# Patient Record
Sex: FEMALE | Race: WHITE | Employment: UNEMPLOYED | URBAN - METROPOLITAN AREA
[De-identification: names, ages, dates, MRNs, and addresses within clinical notes are randomized per-mention and may not be internally consistent; named-entity substitution may affect disease eponyms.]

---

## 2017-01-04 RX ORDER — ALPRAZOLAM 0.25 MG/1
TABLET ORAL
Qty: 30 TABLET | Refills: 0 | Status: SHIPPED | OUTPATIENT
Start: 2017-01-04 | End: 2017-02-13

## 2017-01-04 NOTE — TELEPHONE ENCOUNTER
From: Isidra Ventura  To: Rakan Ching DO  Sent: 1/4/2017 1:55 PM CST  Subject: Medication Renewal Request    Original authorizing provider: DO Isidra Godoy would like a refill of the following medications:  alprazolam 0.25 MG Or

## 2017-01-05 RX ORDER — ALPRAZOLAM 0.25 MG/1
TABLET ORAL
Qty: 30 TABLET | Refills: 0 | OUTPATIENT
Start: 2017-01-05

## 2017-01-20 RX ORDER — ZOLPIDEM TARTRATE 10 MG/1
10 TABLET ORAL NIGHTLY PRN
Qty: 30 TABLET | Refills: 2 | Status: SHIPPED | OUTPATIENT
Start: 2017-01-20 | End: 2017-03-08

## 2017-01-20 NOTE — TELEPHONE ENCOUNTER
From: [facility]  To: Riya Watkins DO  Sent: 1/19/2017 9:47 AM CST  Subject: Medication Renewal Request    Original authorizing provider: Juan Nash DO    [facility] would like a refill of the following medications:  ZOLPIDEM TARTRATE 10

## 2017-02-13 RX ORDER — ALPRAZOLAM 0.25 MG/1
TABLET ORAL
Qty: 30 TABLET | Refills: 0 | Status: SHIPPED | OUTPATIENT
Start: 2017-02-13 | End: 2017-05-08

## 2017-03-04 ENCOUNTER — TELEPHONE (OUTPATIENT)
Dept: FAMILY MEDICINE CLINIC | Facility: CLINIC | Age: 52
End: 2017-03-04

## 2017-03-06 NOTE — TELEPHONE ENCOUNTER
Pt calling to check her My Chart message was received regarding medication refill of Zolpidem Tartrate 10 MG Oral Tab. Noted message received 3/4/17 and informed pt to allow up to 72 hr for refills and she verbalized understanding.  Pt reports she has 1 pil

## 2017-03-10 RX ORDER — ZOLPIDEM TARTRATE 10 MG/1
TABLET ORAL
Qty: 30 TABLET | Refills: 0 | Status: SHIPPED | OUTPATIENT
Start: 2017-03-10 | End: 2017-06-30

## 2017-05-04 RX ORDER — ZOLPIDEM TARTRATE 10 MG/1
TABLET ORAL
Qty: 30 TABLET | Refills: 0 | Status: CANCELLED | OUTPATIENT
Start: 2017-05-04

## 2017-05-04 NOTE — TELEPHONE ENCOUNTER
From: Isidra Ventura  To: Melissa Alarcon  Sent: 4/25/2017 5:17 AM CDT  Subject: Medication Renewal Request    Original authorizing provider: MANDIE Dhaliwal would like a refill of the following medications:  Randine Fabry

## 2017-05-08 ENCOUNTER — LAB ENCOUNTER (OUTPATIENT)
Dept: LAB | Age: 52
End: 2017-05-08
Attending: FAMILY MEDICINE
Payer: COMMERCIAL

## 2017-05-08 DIAGNOSIS — D75.89 MACROCYTOSIS: ICD-10-CM

## 2017-05-08 DIAGNOSIS — E55.9 VITAMIN D DEFICIENCY: ICD-10-CM

## 2017-05-08 DIAGNOSIS — D72.810 LYMPHOCYTOPENIA: ICD-10-CM

## 2017-05-08 PROCEDURE — 82607 VITAMIN B-12: CPT | Performed by: FAMILY MEDICINE

## 2017-05-08 PROCEDURE — 82306 VITAMIN D 25 HYDROXY: CPT | Performed by: FAMILY MEDICINE

## 2017-05-08 PROCEDURE — 36415 COLL VENOUS BLD VENIPUNCTURE: CPT | Performed by: FAMILY MEDICINE

## 2017-05-08 PROCEDURE — 85025 COMPLETE CBC W/AUTO DIFF WBC: CPT | Performed by: FAMILY MEDICINE

## 2017-05-08 PROCEDURE — 82746 ASSAY OF FOLIC ACID SERUM: CPT | Performed by: FAMILY MEDICINE

## 2017-05-08 NOTE — PROGRESS NOTES
Dahiana Rush is a 47year old female. HPI:   Pt. Is here to follow up on her meds. Insomnia -- She is using the Ambien nightly to sleep. She tries to take half a tablet, but many times she does end up taking the entire tablet.   She denies any side Diagnosis Date   • Depression    • Other screening mammogram    • Screening for malignant neoplasm of the cervix       Social History:    Smoking Status: Never Smoker                      Smokeless Status: Never Used                        Alcohol Use: Y on exertion  GI: denies abdominal pain,denies heartburn  PSYCHE: Anxiety  ENDO: insomnia; overweight  MUSCULOSKELETAL: denies back pain  EXTREMITIES:  No pain or numbness    EXAM:   /78 mmHg  Pulse 70  Resp 14  Ht 64.5\"  Wt 152 lb  BMI 25.70 kg/m2

## 2017-06-28 RX ORDER — ALPRAZOLAM 0.25 MG/1
TABLET ORAL
Qty: 30 TABLET | Refills: 0 | Status: SHIPPED | OUTPATIENT
Start: 2017-06-28 | End: 2017-08-09

## 2017-06-30 RX ORDER — ZOLPIDEM TARTRATE 10 MG/1
TABLET ORAL
Qty: 30 TABLET | Refills: 0 | Status: SHIPPED | OUTPATIENT
Start: 2017-06-30 | End: 2017-08-09

## 2017-06-30 NOTE — TELEPHONE ENCOUNTER
See below. Rx was approved 6/28 for xanax, do you recall signing?   Rx pended for zolpidem  Last fill 3/10

## 2017-06-30 NOTE — TELEPHONE ENCOUNTER
Pt went to pharmacy to p/u ALPRAZOLAM 0.25 MG Oral Tab and [facility] told her they never received. Pls re-fax. Also, pt is asking for refill on Ambien to [facility].

## 2017-08-09 RX ORDER — ZOLPIDEM TARTRATE 10 MG/1
TABLET ORAL
Qty: 30 TABLET | Refills: 0 | Status: SHIPPED | OUTPATIENT
Start: 2017-08-09 | End: 2017-09-08

## 2017-08-09 RX ORDER — ALPRAZOLAM 0.25 MG/1
0.25 TABLET ORAL NIGHTLY PRN
Qty: 30 TABLET | Refills: 0 | Status: SHIPPED | OUTPATIENT
Start: 2017-08-09 | End: 2017-10-16

## 2017-08-09 NOTE — TELEPHONE ENCOUNTER
From: Bennett Sidhu  Sent: 8/9/2017 6:17 AM CDT  Subject: Medication Renewal Request    Bennett Sidhu would like a refill of the following medications:  ALPRAZOLAM 0.25 MG Oral Tab [Regine He DO]  Zolpidem Tartrate 10 MG Oral Tab [Regine He

## 2017-09-08 RX ORDER — ZOLPIDEM TARTRATE 10 MG/1
TABLET ORAL
Qty: 30 TABLET | Refills: 0
Start: 2017-09-08

## 2017-09-11 RX ORDER — ZOLPIDEM TARTRATE 10 MG/1
TABLET ORAL
Qty: 30 TABLET | Refills: 0 | Status: SHIPPED | OUTPATIENT
Start: 2017-09-11 | End: 2017-10-16

## 2017-10-03 ENCOUNTER — OFFICE VISIT (OUTPATIENT)
Dept: FAMILY MEDICINE CLINIC | Facility: CLINIC | Age: 52
End: 2017-10-03

## 2017-10-03 ENCOUNTER — HOSPITAL ENCOUNTER (OUTPATIENT)
Dept: GENERAL RADIOLOGY | Age: 52
Discharge: HOME | End: 2017-10-03
Attending: FAMILY MEDICINE
Payer: COMMERCIAL

## 2017-10-03 VITALS
RESPIRATION RATE: 14 BRPM | DIASTOLIC BLOOD PRESSURE: 88 MMHG | HEIGHT: 65 IN | BODY MASS INDEX: 24.99 KG/M2 | WEIGHT: 150 LBS | HEART RATE: 68 BPM | SYSTOLIC BLOOD PRESSURE: 130 MMHG

## 2017-10-03 DIAGNOSIS — M25.511 CHRONIC RIGHT SHOULDER PAIN: ICD-10-CM

## 2017-10-03 DIAGNOSIS — Z00.00 ROUTINE GENERAL MEDICAL EXAMINATION AT A HEALTH CARE FACILITY: Primary | ICD-10-CM

## 2017-10-03 DIAGNOSIS — Z13.89 SCREENING FOR GENITOURINARY CONDITION: ICD-10-CM

## 2017-10-03 DIAGNOSIS — G89.29 CHRONIC RIGHT SHOULDER PAIN: ICD-10-CM

## 2017-10-03 DIAGNOSIS — Z23 NEED FOR VACCINATION: ICD-10-CM

## 2017-10-03 DIAGNOSIS — Z00.00 LABORATORY EXAMINATION ORDERED AS PART OF A ROUTINE GENERAL MEDICAL EXAMINATION: ICD-10-CM

## 2017-10-03 DIAGNOSIS — Z12.4 SCREENING FOR CERVICAL CANCER: ICD-10-CM

## 2017-10-03 DIAGNOSIS — Z12.39 SCREENING FOR MALIGNANT NEOPLASM OF BREAST: ICD-10-CM

## 2017-10-03 PROCEDURE — 81003 URINALYSIS AUTO W/O SCOPE: CPT | Performed by: FAMILY MEDICINE

## 2017-10-03 PROCEDURE — 87624 HPV HI-RISK TYP POOLED RSLT: CPT | Performed by: FAMILY MEDICINE

## 2017-10-03 PROCEDURE — 90471 IMMUNIZATION ADMIN: CPT | Performed by: FAMILY MEDICINE

## 2017-10-03 PROCEDURE — 88175 CYTOPATH C/V AUTO FLUID REDO: CPT | Performed by: FAMILY MEDICINE

## 2017-10-03 PROCEDURE — 90686 IIV4 VACC NO PRSV 0.5 ML IM: CPT | Performed by: FAMILY MEDICINE

## 2017-10-03 PROCEDURE — 99396 PREV VISIT EST AGE 40-64: CPT | Performed by: FAMILY MEDICINE

## 2017-10-03 PROCEDURE — 73030 X-RAY EXAM OF SHOULDER: CPT | Performed by: FAMILY MEDICINE

## 2017-10-03 NOTE — H&P
CC: Annual Physical Exam    HPI:   Lexus Salas is a 47year old female who presents for a complete physical exam. Symptoms: is menopausal. Patient complains of right shoulder discomfort for the past 5 months    Wt Readings from Last 6 Encounters:  10 RIG* Right      Comment: benign.   3300,0247,1684:   : OTHER SURGICAL HISTORY      Comment: big right toe -- surgical screws  : OTHER SURGICAL HISTORY      Comment: deviated septum   Family History   Problem Relation Age of Onset   • Breast Canc discharge  LUNGS: clear to auscultation  CARDIO: RRR without murmur  GI: good BS's,no masses, HSM or tenderness  :introitus is normal,scant discharge,cervix is pink,no adnexal masses or tenderness, PAP was done ; cystocele  MUSCULOSKELETAL: back is not t

## 2017-10-16 RX ORDER — ZOLPIDEM TARTRATE 10 MG/1
10 TABLET ORAL NIGHTLY
Qty: 30 TABLET | Refills: 0 | Status: SHIPPED | OUTPATIENT
Start: 2017-10-16 | End: 2017-11-21

## 2017-10-16 RX ORDER — ALPRAZOLAM 0.25 MG/1
0.25 TABLET ORAL NIGHTLY PRN
Qty: 30 TABLET | Refills: 0 | Status: SHIPPED | OUTPATIENT
Start: 2017-10-16 | End: 2017-11-21

## 2017-10-16 NOTE — TELEPHONE ENCOUNTER
From: Horacio Jain  Sent: 10/16/2017 8:50 AM CDT  Subject: Medication Renewal Request    Horacio Jain would like a refill of the following medications:     ALPRAZolam 0.25 MG Oral Tab [Regine He DO]     ZOLPIDEM TARTRATE 10 MG Oral Tab [Regine

## 2017-11-24 RX ORDER — ZOLPIDEM TARTRATE 10 MG/1
10 TABLET ORAL NIGHTLY
Qty: 30 TABLET | Refills: 0 | Status: SHIPPED | OUTPATIENT
Start: 2017-11-24 | End: 2018-02-05 | Stop reason: SDUPTHER

## 2017-11-24 RX ORDER — ALPRAZOLAM 0.25 MG/1
0.25 TABLET ORAL NIGHTLY PRN
Qty: 30 TABLET | Refills: 0
Start: 2017-11-24

## 2017-11-24 RX ORDER — ZOLPIDEM TARTRATE 10 MG/1
10 TABLET ORAL NIGHTLY
Qty: 30 TABLET | Refills: 0
Start: 2017-11-24 | End: 2018-02-05 | Stop reason: SDUPTHER

## 2017-11-24 RX ORDER — ALPRAZOLAM 0.25 MG/1
0.25 TABLET ORAL NIGHTLY PRN
Qty: 30 TABLET | Refills: 0 | Status: SHIPPED | OUTPATIENT
Start: 2017-11-24 | End: 2018-01-04

## 2017-11-24 NOTE — TELEPHONE ENCOUNTER
Non protocol medication. LOV 10/3/2017.   Last refill 10/16/2017  Scheduled appointment 12/20/2017  Please approve if appropriate, thank you

## 2017-11-24 NOTE — TELEPHONE ENCOUNTER
From: Roger Ott  Sent: 11/21/2017 8:16 AM CST  Subject: Medication Renewal Request    Roger Ott would like a refill of the following medications:     ALPRAZolam 0.25 MG Oral Tab [Regine He DO]     Zolpidem Tartrate 10 MG Oral Tab [Regine

## 2017-11-24 NOTE — TELEPHONE ENCOUNTER
From: Nam Waller  Sent: 11/24/2017 6:22 AM CST  Subject: Medication Renewal Request    Nam Waller would like a refill of the following medications:     ALPRAZolam 0.25 MG Oral Tab [Regine He DO]     Zolpidem Tartrate 10 MG Oral Tab [Regine

## 2017-11-27 RX ORDER — ZOLPIDEM TARTRATE 10 MG/1
TABLET ORAL
Qty: 30 TABLET | Refills: 2 | Status: SHIPPED | OUTPATIENT
Start: 2017-11-27 | End: 2018-01-04

## 2017-11-27 RX ORDER — ZOLPIDEM TARTRATE 10 MG/1
TABLET ORAL
Qty: 30 TABLET | Refills: 0 | OUTPATIENT
Start: 2017-11-27

## 2017-11-27 RX ORDER — ALPRAZOLAM 0.25 MG/1
TABLET ORAL
Qty: 30 TABLET | Refills: 0 | OUTPATIENT
Start: 2017-11-27

## 2017-11-27 NOTE — TELEPHONE ENCOUNTER
Not protocol medication. LOV :10/03/17 physical   Last labs done :5/08/17  Next appointment :12/20/17  Please see pending medication. Refill if appropriate.    Last refill:    Date:11/24/17  Amount :30 tablets  Medication: zolpidem Good

## 2018-01-04 RX ORDER — ZOLPIDEM TARTRATE 10 MG/1
TABLET ORAL
Qty: 30 TABLET | Refills: 0 | Status: SHIPPED | OUTPATIENT
Start: 2018-01-04 | End: 2018-02-02

## 2018-01-04 RX ORDER — ALPRAZOLAM 0.25 MG/1
0.25 TABLET ORAL NIGHTLY PRN
Qty: 30 TABLET | Refills: 0 | Status: SHIPPED | OUTPATIENT
Start: 2018-01-04 | End: 2018-02-02

## 2018-02-05 RX ORDER — ALPRAZOLAM 0.25 MG/1
TABLET ORAL
Qty: 30 TABLET | Refills: 0 | Status: SHIPPED | OUTPATIENT
Start: 2018-02-05 | End: 2018-03-13

## 2018-02-05 RX ORDER — ZOLPIDEM TARTRATE 10 MG/1
TABLET ORAL
Qty: 30 TABLET | Refills: 0 | Status: SHIPPED | OUTPATIENT
Start: 2018-02-05 | End: 2018-03-13

## 2018-02-05 NOTE — TELEPHONE ENCOUNTER
Pt would like meds as soon as possible she will be leaving to go [place] tomorrow am. She is already in [place] but does not know a pharmacy by her so please call in to local [facility] and she will have it transferred. Please advise. Thank you.

## 2018-02-05 NOTE — TELEPHONE ENCOUNTER
ZOLPIDEM 10MG TABLETS    ALPRAZOLAM 0.25MG TABLETS    Not per protocol medications. Rx is pending for your approval.    Please print & sign,    Thank you    Last OV was on [id number]. Last refill was on 01*04*18 both for 30/0 refills.

## 2018-03-08 ENCOUNTER — PATIENT MESSAGE (OUTPATIENT)
Dept: FAMILY MEDICINE CLINIC | Facility: CLINIC | Age: 53
End: 2018-03-08

## 2018-03-09 RX ORDER — ZOLPIDEM TARTRATE 10 MG/1
10 TABLET ORAL NIGHTLY
Qty: 30 TABLET | Refills: 0 | Status: CANCELLED | OUTPATIENT
Start: 2018-03-09

## 2018-03-09 RX ORDER — ALPRAZOLAM 0.25 MG/1
0.25 TABLET ORAL NIGHTLY PRN
Qty: 30 TABLET | Refills: 0 | Status: CANCELLED | OUTPATIENT
Start: 2018-03-09

## 2018-03-09 NOTE — TELEPHONE ENCOUNTER
From: Jonathan Jimenez  Sent: 3/8/2018 9:16 AM CST  Subject: Medication Renewal Request    Jonathan Jimenez would like a refill of the following medications:     ZOLPIDEM TARTRATE 10 MG Oral Tab Reese Car, DO]   Patient Comment: My [facility] requires a new prescription called in or faxed in.  The address is [address], [place] [address] and the number is [phone] please put Dr Renetta Slater on prescription because last time it was listed as Dr. Deangelo Yanez     ALPRAZOLAM 0.25 MG Oral Tab Reese Car, DO]   Patient Comment: Please see above for this as well    Preferred pharmacy: Other - [facility] [address] [place] [id number]

## 2018-03-12 ENCOUNTER — TELEPHONE (OUTPATIENT)
Dept: FAMILY MEDICINE CLINIC | Facility: CLINIC | Age: 53
End: 2018-03-12

## 2018-03-12 NOTE — TELEPHONE ENCOUNTER
Pt called our office and requested for a copy of her previous mammograms (test results only). I took a verbal from pt for this time and if additional medical records are needed in the future, pt will need to complete one of our forms.   Pt's test results (

## 2018-03-13 RX ORDER — ALPRAZOLAM 0.25 MG/1
TABLET ORAL
Qty: 30 TABLET | Refills: 0 | Status: SHIPPED | OUTPATIENT
Start: 2018-03-13 | End: 2018-04-21

## 2018-03-13 RX ORDER — ZOLPIDEM TARTRATE 10 MG/1
TABLET ORAL
Qty: 30 TABLET | Refills: 0 | Status: SHIPPED | OUTPATIENT
Start: 2018-03-13 | End: 2018-04-21

## 2018-03-13 NOTE — TELEPHONE ENCOUNTER
From: Bennett Sidhu  To: Becka Gillis DO  Sent: 3/8/2018 9:19 AM CST  Subject: Prescription Question    Is it possible to have more than 1 month refills on my 2 meds: ambient and Xanax?  Thanks  Pio Jones

## 2018-03-13 NOTE — TELEPHONE ENCOUNTER
I would usually have not problem but she is due for a 6 month med check and no appt is on file. I will fill for more after next med check.

## 2018-04-26 NOTE — TELEPHONE ENCOUNTER
Last fill on both of these was 3/13 #30  Pt has appt next month. Please approve if appropriate. Thanks.

## 2018-04-26 NOTE — TELEPHONE ENCOUNTER
From: Peyton Dee  Sent: 4/21/2018 6:36 PM CDT  Subject: Medication Renewal Request    Peyton Dee would like a refill of the following medications:     Zolpidem Tartrate 10 MG Oral Tab [Regine He DO]     ALPRAZolam 0.25 MG Oral Tab Jamaal Keyes

## 2018-04-27 RX ORDER — ZOLPIDEM TARTRATE 10 MG/1
TABLET ORAL
Qty: 30 TABLET | Refills: 0 | Status: SHIPPED | OUTPATIENT
Start: 2018-04-27

## 2018-04-27 RX ORDER — ALPRAZOLAM 0.25 MG/1
TABLET ORAL
Qty: 30 TABLET | Refills: 0 | Status: SHIPPED | OUTPATIENT
Start: 2018-04-27

## 2018-10-29 ENCOUNTER — CHARTING TRANS (OUTPATIENT)
Dept: OTHER | Age: 53
End: 2018-10-29

## 2018-10-29 ENCOUNTER — LAB SERVICES (OUTPATIENT)
Dept: OTHER | Age: 53
End: 2018-10-29

## 2018-10-29 LAB — RAPID STREP GROUP A: NORMAL

## 2018-11-01 ENCOUNTER — CHARTING TRANS (OUTPATIENT)
Dept: OTHER | Age: 53
End: 2018-11-01

## 2018-11-04 LAB — CULTURE STREP GRP A (STTH) HL: NORMAL

## 2018-12-08 VITALS
TEMPERATURE: 99.2 F | DIASTOLIC BLOOD PRESSURE: 70 MMHG | RESPIRATION RATE: 16 BRPM | HEART RATE: 77 BPM | WEIGHT: 150 LBS | SYSTOLIC BLOOD PRESSURE: 100 MMHG | BODY MASS INDEX: 24.11 KG/M2 | HEIGHT: 66 IN | OXYGEN SATURATION: 98 %

## 2020-03-03 ENCOUNTER — PATIENT OUTREACH (OUTPATIENT)
Dept: FAMILY MEDICINE CLINIC | Facility: CLINIC | Age: 55
End: 2020-03-03

## (undated) NOTE — MR AVS SNAPSHOT
[facility], [address] [id number]               Thank you for choosing us for your health care visit with Nino Slaughter DO.   We are glad to serve you and happy to provide you with this summary Encounter for screening mammogram for breast cancer          Instructions and Information about Your Health     None      Allergies as of May 08, 2017     No Known Allergies                Today's Vital Signs     BP Pulse Height Weight BMI    110/78 mmHg [email or web address] If you've recently had a stay at the Hospital you can access your discharge instructions in [facility] by going to Visits < Admission Summaries.  If you've been to the Emergency Department or your doctor's office, you can view yo Visit [facility] online at  [email or web address]